# Patient Record
Sex: FEMALE | Race: WHITE
[De-identification: names, ages, dates, MRNs, and addresses within clinical notes are randomized per-mention and may not be internally consistent; named-entity substitution may affect disease eponyms.]

---

## 2018-08-19 ENCOUNTER — HOSPITAL ENCOUNTER (EMERGENCY)
Dept: HOSPITAL 25 - UCCORT | Age: 23
Discharge: HOME | End: 2018-08-19
Payer: COMMERCIAL

## 2018-08-19 VITALS — DIASTOLIC BLOOD PRESSURE: 79 MMHG | SYSTOLIC BLOOD PRESSURE: 129 MMHG

## 2018-08-19 DIAGNOSIS — Z88.0: ICD-10-CM
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
## 2018-08-19 DIAGNOSIS — F17.210: ICD-10-CM

## 2018-08-19 DIAGNOSIS — S80.12XA: ICD-10-CM

## 2018-08-19 DIAGNOSIS — S13.4XXA: Primary | ICD-10-CM

## 2018-08-19 PROCEDURE — G0463 HOSPITAL OUTPT CLINIC VISIT: HCPCS

## 2018-08-19 PROCEDURE — 99212 OFFICE O/P EST SF 10 MIN: CPT

## 2018-08-19 PROCEDURE — 72050 X-RAY EXAM NECK SPINE 4/5VWS: CPT

## 2018-08-19 PROCEDURE — 72020 X-RAY EXAM OF SPINE 1 VIEW: CPT
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
## 2018-08-21 ENCOUNTER — HOSPITAL ENCOUNTER (EMERGENCY)
Dept: HOSPITAL 25 - UCCORT | Age: 23
Discharge: HOME | End: 2018-08-21
Payer: COMMERCIAL

## 2018-08-21 VITALS — DIASTOLIC BLOOD PRESSURE: 81 MMHG | SYSTOLIC BLOOD PRESSURE: 127 MMHG

## 2018-08-21 DIAGNOSIS — F17.210: ICD-10-CM
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
## 2018-08-21 DIAGNOSIS — S13.9XXA: Primary | ICD-10-CM

## 2018-08-21 DIAGNOSIS — Z88.0: ICD-10-CM
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
## 2018-08-21 PROCEDURE — G0463 HOSPITAL OUTPT CLINIC VISIT: HCPCS

## 2018-08-21 PROCEDURE — 99211 OFF/OP EST MAY X REQ PHY/QHP: CPT
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
